# Patient Record
Sex: FEMALE | Race: WHITE | Employment: OTHER | URBAN - METROPOLITAN AREA
[De-identification: names, ages, dates, MRNs, and addresses within clinical notes are randomized per-mention and may not be internally consistent; named-entity substitution may affect disease eponyms.]

---

## 2019-04-10 ENCOUNTER — HOSPITAL ENCOUNTER (OUTPATIENT)
Facility: CLINIC | Age: 84
Discharge: HOME | End: 2019-04-10
Attending: COLON & RECTAL SURGERY | Admitting: COLON & RECTAL SURGERY
Payer: MEDICARE

## 2019-04-10 VITALS
OXYGEN SATURATION: 98 % | HEART RATE: 86 BPM | DIASTOLIC BLOOD PRESSURE: 67 MMHG | RESPIRATION RATE: 16 BRPM | SYSTOLIC BLOOD PRESSURE: 96 MMHG

## 2019-04-10 LAB — COLONOSCOPY: NORMAL

## 2019-04-10 PROCEDURE — 40000104 ZZH STATISTIC MODERATE SEDATION < 10 MIN: Performed by: COLON & RECTAL SURGERY

## 2019-04-10 PROCEDURE — 88305 TISSUE EXAM BY PATHOLOGIST: CPT | Performed by: COLON & RECTAL SURGERY

## 2019-04-10 PROCEDURE — 25000128 H RX IP 250 OP 636: Performed by: COLON & RECTAL SURGERY

## 2019-04-10 PROCEDURE — 45380 COLONOSCOPY AND BIOPSY: CPT | Mod: PT,XU | Performed by: COLON & RECTAL SURGERY

## 2019-04-10 PROCEDURE — 99153 MOD SED SAME PHYS/QHP EA: CPT | Performed by: COLON & RECTAL SURGERY

## 2019-04-10 PROCEDURE — 45385 COLONOSCOPY W/LESION REMOVAL: CPT | Mod: PT | Performed by: COLON & RECTAL SURGERY

## 2019-04-10 PROCEDURE — G0500 MOD SEDAT ENDO SERVICE >5YRS: HCPCS | Performed by: COLON & RECTAL SURGERY

## 2019-04-10 RX ORDER — ONDANSETRON 2 MG/ML
4 INJECTION INTRAMUSCULAR; INTRAVENOUS
Status: DISCONTINUED | OUTPATIENT
Start: 2019-04-10 | End: 2019-04-10 | Stop reason: HOSPADM

## 2019-04-10 RX ORDER — LIDOCAINE 40 MG/G
CREAM TOPICAL
Status: DISCONTINUED | OUTPATIENT
Start: 2019-04-10 | End: 2019-04-10 | Stop reason: HOSPADM

## 2019-04-10 RX ORDER — FENTANYL CITRATE 50 UG/ML
INJECTION, SOLUTION INTRAMUSCULAR; INTRAVENOUS PRN
Status: DISCONTINUED | OUTPATIENT
Start: 2019-04-10 | End: 2019-04-10 | Stop reason: HOSPADM

## 2019-04-10 RX ORDER — MULTIVIT-MIN/IRON/FOLIC ACID/K 18-600-40
CAPSULE ORAL
COMMUNITY

## 2019-04-10 RX ORDER — ONDANSETRON 4 MG/1
4 TABLET, ORALLY DISINTEGRATING ORAL EVERY 6 HOURS PRN
Status: DISCONTINUED | OUTPATIENT
Start: 2019-04-10 | End: 2019-04-10 | Stop reason: HOSPADM

## 2019-04-10 RX ORDER — UBIDECARENONE 75 MG
100 CAPSULE ORAL DAILY
COMMUNITY

## 2019-04-10 RX ORDER — ONDANSETRON 2 MG/ML
4 INJECTION INTRAMUSCULAR; INTRAVENOUS EVERY 6 HOURS PRN
Status: DISCONTINUED | OUTPATIENT
Start: 2019-04-10 | End: 2019-04-10 | Stop reason: HOSPADM

## 2019-04-10 RX ORDER — FLUMAZENIL 0.1 MG/ML
0.2 INJECTION, SOLUTION INTRAVENOUS
Status: DISCONTINUED | OUTPATIENT
Start: 2019-04-10 | End: 2019-04-10 | Stop reason: HOSPADM

## 2019-04-10 RX ORDER — BIOTIN 1 MG
1000 TABLET ORAL DAILY
COMMUNITY

## 2019-04-10 RX ORDER — NALOXONE HYDROCHLORIDE 0.4 MG/ML
.1-.4 INJECTION, SOLUTION INTRAMUSCULAR; INTRAVENOUS; SUBCUTANEOUS
Status: DISCONTINUED | OUTPATIENT
Start: 2019-04-10 | End: 2019-04-10 | Stop reason: HOSPADM

## 2019-04-10 NOTE — BRIEF OP NOTE
[facility]    Brief Operative Note    Pre-operative diagnosis: PERSONMAL HISTORY OF POLYPS  Post-operative diagnosis colon polyps, diverticulosis  Procedure: Procedure(s):  COMBINED COLONOSCOPY, SINGLE OR MULTIPLE BIOPSY/POLYPECTOMY BY BIOPSY  Combined Colonoscopy, Remove Tumor/Polyp/Lesion By Snare  Surgeon: Surgeon(s) and Role:     * Tiffany Lezama MD - Primary  Anesthesia: Conscious Sedation   Estimated blood loss: Minimal  Drains: None  Specimens:   ID Type Source Tests Collected by Time Destination   A : polyps x3 Polyp Large Intestine, Hepatic Flexure SURGICAL PATHOLOGY EXAM Jessica Cid, RN 4/10/2019 12:50 PM    B :  Polyp Large Intestine, Left/Descending SURGICAL PATHOLOGY EXAM Jessica Cid RN 4/10/2019 12:51 PM      Findings:   See Provation procedure note in Epic    Complications: None.  Implants: None.

## 2019-04-12 LAB — COPATH REPORT: NORMAL

## 2022-05-03 NOTE — H&P
Pre-Endoscopy History and Physical     Ginny Hassan MRN# [id number]   YOB: 1935 Age: 83 year old     Date of Procedure: 4/10/2019  Primary care provider: Reymundo Elmore  Type of Endoscopy: colonoscopy  Reason for Procedure: screening  Type of Anesthesia Anticipated: moderate sedation    HPI:    Ginny is a 83 year old female who will be undergoing the above procedure.  Patient has a history of adenomatous polyps.  Patient states that since having C. Diff in 2016 her bowel habits have been irregular; varying from loose stool to constipation. She recently started eating probiotic foods. She denies recent bleeding. She had 3 polyps removed during her last colonoscopy in 2016.    A history and physical has been performed. The patient's medications and allergies have been reviewed. The risks and benefits of the procedure and the sedation options and risks were discussed with the patient.  All questions were answered and informed consent was obtained.      She denies a personal or family history of anesthesia complications or bleeding disorders.   Prior to Admission medications    Medication Sig Start Date End Date Taking? Authorizing Provider   Ascorbic Acid (VITAMIN C) 500 MG CAPS    Yes Reported, Patient   biotin 1000 MCG TABS tablet Take 1,000 mcg by mouth daily   Yes Reported, Patient   cyanocobalamin (VITAMIN B-12) 100 MCG tablet Take 100 mcg by mouth daily   Yes Reported, Patient   vitamin D3 (CHOLECALCIFEROL) 1000 units (25 mcg) tablet Take by mouth daily   Yes Reported, Patient   VITAMIN E PO    Yes Reported, Patient   Multiple Vitamin (DAILY MULTIVITAMIN PO)     Reported, Patient       Allergies   Allergen Reactions     Nitrofurantoin Other (See Comments)     Swollen eyes     No Clinical Screening - See Comments Hives     PN: LW CM1: Contrast IV - Nonionic Reaction :  HIVES     Sulfamethoxazole-Trimethoprim Other (See Comments)     diarrhea     Ciprofloxacin Muscle Pain (Myalgia)      Contrast Dye Hives     Penicillins      Cephalexin Other (See Comments)     PN: LW Reaction: UTI per PT states that she thinks she overdosed on it. 5 tablets/day x 40 days.  She is not allergic to the cephalexin per Ginny.          Current Facility-Administered Medications   Medication     lidocaine (LMX4) cream     lidocaine 1 % 0.1-1 mL     ondansetron (ZOFRAN) injection 4 mg     sodium chloride (PF) 0.9% PF flush 3 mL     sodium chloride (PF) 0.9% PF flush 3 mL       Patient Active Problem List   Diagnosis     Endometrial polyp     Postmenopausal atrophic vaginitis     Osteoporosis     Hyperlipidemia     Hematuria     Diverticulosis of large intestine     Dermatophytosis of nail     Benign neoplasm of colon     Basal cell carcinoma of face        Past Medical History:   Diagnosis Date     Basal cell carcinoma      Endometrial polyp      Hydronephrosis      Kidney stones      Stress incontinence in female         Past Surgical History:   Procedure Laterality Date     BASAL CELL SKIN EXCISION[       BREAST SURGERY      CYST REMOVAL     COLONOSCOPY       GYN SURGERY      d and c     OPERATIVE HYSTEROSCOPY WITH MORCELLATOR  1/15/2014    Procedure: OPERATIVE HYSTEROSCOPY WITH MORCELLATOR;  HYSTEROSCOPIC POLYPECTOMY,WHIPPLE & NEPHEW 5MM MORCELLATOR;  Surgeon: Madhavi Marin MD;  Location: [facility]     VARICOSE VEIN SURGERY[         Social History     Tobacco Use     Smoking status: Never Smoker     Smokeless tobacco: Never Used   Substance Use Topics     Alcohol use: Yes     Comment: occasional       Family History   Problem Relation Age of Onset     Colon Cancer Paternal Grandmother      Colon Polyps Brother      Colon Polyps Sister      Colon Polyps Son      Colon Polyps Sister        REVIEW OF SYSTEMS:     5 point ROS negative except as noted above in HPI, including Gen., Resp., CV, GI &  system review.      PHYSICAL EXAM:   /82   Resp 16   SpO2 98%  Estimated body mass index is 22.14 kg/m  as  "calculated from the following:    Height as of 1/15/14: 1.626 m (5' 4\").    Weight as of 1/15/14: 58.5 kg (129 lb).   GENERAL APPEARANCE: healthy  MENTAL STATUS: alert  AIRWAY EXAM: Mallampatti Class II (visualization of the soft palate, fauces, and uvula)  RESP: lungs clear to auscultation - no rales, rhonchi or wheezes  CV: regular rates and rhythm      DIAGNOSTICS:    Not indicated      IMPRESSION   ASA Class 2 - Mild systemic disease        PLAN:       Colonoscopy with possible polypectomy, possible biopsy. The indications, procedure and risks were explained to the patient who agrees to proceed.       The above has been forwarded to the consulting provider.      Signed Electronically by: Tiffany Lezama  April 10, 2019          " 0

## (undated) RX ORDER — FENTANYL CITRATE 50 UG/ML
INJECTION, SOLUTION INTRAMUSCULAR; INTRAVENOUS
Status: DISPENSED
Start: 2019-04-10